# Patient Record
Sex: MALE | Race: WHITE | NOT HISPANIC OR LATINO | URBAN - METROPOLITAN AREA
[De-identification: names, ages, dates, MRNs, and addresses within clinical notes are randomized per-mention and may not be internally consistent; named-entity substitution may affect disease eponyms.]

---

## 2022-09-13 ENCOUNTER — APPOINTMENT (RX ONLY)
Dept: URBAN - METROPOLITAN AREA CLINIC 139 | Facility: CLINIC | Age: 77
Setting detail: DERMATOLOGY
End: 2022-09-13

## 2022-09-13 DIAGNOSIS — L82.1 OTHER SEBORRHEIC KERATOSIS: ICD-10-CM

## 2022-09-13 DIAGNOSIS — L57.0 ACTINIC KERATOSIS: ICD-10-CM

## 2022-09-13 DIAGNOSIS — D18.0 HEMANGIOMA: ICD-10-CM

## 2022-09-13 DIAGNOSIS — L57.8 OTHER SKIN CHANGES DUE TO CHRONIC EXPOSURE TO NONIONIZING RADIATION: ICD-10-CM

## 2022-09-13 PROBLEM — D18.01 HEMANGIOMA OF SKIN AND SUBCUTANEOUS TISSUE: Status: ACTIVE | Noted: 2022-09-13

## 2022-09-13 PROCEDURE — ? COUNSELING

## 2022-09-13 PROCEDURE — 17000 DESTRUCT PREMALG LESION: CPT

## 2022-09-13 PROCEDURE — 99213 OFFICE O/P EST LOW 20 MIN: CPT | Mod: 25

## 2022-09-13 PROCEDURE — 17003 DESTRUCT PREMALG LES 2-14: CPT

## 2022-09-13 PROCEDURE — ? LIQUID NITROGEN

## 2022-09-13 ASSESSMENT — LOCATION DETAILED DESCRIPTION DERM
LOCATION DETAILED: RIGHT PROXIMAL PRETIBIAL REGION
LOCATION DETAILED: PERIUMBILICAL SKIN
LOCATION DETAILED: STERNUM
LOCATION DETAILED: RIGHT LATERAL SUPERIOR CHEST
LOCATION DETAILED: RIGHT PROXIMAL DORSAL FOREARM
LOCATION DETAILED: LEFT SUPERIOR MEDIAL MIDBACK
LOCATION DETAILED: RIGHT LATERAL FOREHEAD
LOCATION DETAILED: LEFT PROXIMAL DORSAL FOREARM
LOCATION DETAILED: LEFT INFERIOR MEDIAL MIDBACK
LOCATION DETAILED: RIGHT INFERIOR UPPER BACK
LOCATION DETAILED: NASAL ROOT
LOCATION DETAILED: RIGHT SUPERIOR LATERAL UPPER BACK
LOCATION DETAILED: LEFT LATERAL SUPERIOR CHEST
LOCATION DETAILED: LEFT SUPERIOR LATERAL UPPER BACK
LOCATION DETAILED: LEFT INFERIOR UPPER BACK
LOCATION DETAILED: RIGHT LATERAL ABDOMEN
LOCATION DETAILED: RIGHT SUPERIOR PARIETAL SCALP
LOCATION DETAILED: RIGHT SUPERIOR UPPER BACK
LOCATION DETAILED: LEFT MID-UPPER BACK
LOCATION DETAILED: LEFT PROXIMAL PRETIBIAL REGION
LOCATION DETAILED: NASAL DORSUM
LOCATION DETAILED: LEFT MEDIAL SUPERIOR CHEST
LOCATION DETAILED: INFERIOR THORACIC SPINE
LOCATION DETAILED: EPIGASTRIC SKIN
LOCATION DETAILED: RIGHT MEDIAL INFERIOR CHEST
LOCATION DETAILED: RIGHT FOREHEAD

## 2022-09-13 ASSESSMENT — LOCATION SIMPLE DESCRIPTION DERM
LOCATION SIMPLE: LEFT UPPER BACK
LOCATION SIMPLE: LEFT FOREARM
LOCATION SIMPLE: NOSE
LOCATION SIMPLE: ABDOMEN
LOCATION SIMPLE: RIGHT FOREARM
LOCATION SIMPLE: CHEST
LOCATION SIMPLE: UPPER BACK
LOCATION SIMPLE: RIGHT FOREHEAD
LOCATION SIMPLE: LEFT LOWER BACK
LOCATION SIMPLE: SCALP
LOCATION SIMPLE: RIGHT UPPER BACK
LOCATION SIMPLE: LEFT PRETIBIAL REGION
LOCATION SIMPLE: RIGHT PRETIBIAL REGION

## 2022-09-13 ASSESSMENT — LOCATION ZONE DERM
LOCATION ZONE: NOSE
LOCATION ZONE: LEG
LOCATION ZONE: SCALP
LOCATION ZONE: ARM
LOCATION ZONE: TRUNK
LOCATION ZONE: FACE

## 2022-09-13 NOTE — PROCEDURE: LIQUID NITROGEN
Post-Care Instructions: I reviewed with the patient in detail post-care instructions. Patient is to wear sunprotection, and avoid picking at any of the treated lesions. Pt may apply Vaseline to crusted or scabbing areas.
Detail Level: Detailed
Render Post-Care Instructions In Note?: no
Show Aperture Variable?: Yes
Consent: The patient's consent was obtained including but not limited to risks of crusting, scabbing, blistering, scarring, darker or lighter pigmentary change, recurrence, incomplete removal and infection.

## 2023-03-14 ENCOUNTER — APPOINTMENT (RX ONLY)
Dept: URBAN - METROPOLITAN AREA CLINIC 139 | Facility: CLINIC | Age: 78
Setting detail: DERMATOLOGY
End: 2023-03-14

## 2023-03-14 DIAGNOSIS — L82.1 OTHER SEBORRHEIC KERATOSIS: ICD-10-CM

## 2023-03-14 DIAGNOSIS — D18.0 HEMANGIOMA: ICD-10-CM

## 2023-03-14 DIAGNOSIS — L81.4 OTHER MELANIN HYPERPIGMENTATION: ICD-10-CM

## 2023-03-14 DIAGNOSIS — L57.0 ACTINIC KERATOSIS: ICD-10-CM

## 2023-03-14 PROBLEM — D18.01 HEMANGIOMA OF SKIN AND SUBCUTANEOUS TISSUE: Status: ACTIVE | Noted: 2023-03-14

## 2023-03-14 PROCEDURE — ? COUNSELING

## 2023-03-14 PROCEDURE — 17003 DESTRUCT PREMALG LES 2-14: CPT

## 2023-03-14 PROCEDURE — 17000 DESTRUCT PREMALG LESION: CPT

## 2023-03-14 PROCEDURE — ? LIQUID NITROGEN

## 2023-03-14 PROCEDURE — 99213 OFFICE O/P EST LOW 20 MIN: CPT | Mod: 25

## 2023-03-14 ASSESSMENT — LOCATION DETAILED DESCRIPTION DERM
LOCATION DETAILED: RIGHT PROXIMAL PRETIBIAL REGION
LOCATION DETAILED: PERIUMBILICAL SKIN
LOCATION DETAILED: LEFT INFERIOR UPPER BACK
LOCATION DETAILED: RIGHT FOREHEAD
LOCATION DETAILED: LEFT SUPERIOR MEDIAL UPPER BACK
LOCATION DETAILED: RIGHT INFERIOR MEDIAL MIDBACK
LOCATION DETAILED: RIGHT CENTRAL PARIETAL SCALP
LOCATION DETAILED: RIGHT DISTAL POSTERIOR THIGH
LOCATION DETAILED: LEFT DISTAL POSTERIOR THIGH
LOCATION DETAILED: LEFT MEDIAL SUPERIOR CHEST
LOCATION DETAILED: LEFT ANTERIOR PROXIMAL THIGH
LOCATION DETAILED: LEFT LATERAL SUPERIOR CHEST
LOCATION DETAILED: RIGHT PROXIMAL CALF
LOCATION DETAILED: RIGHT MEDIAL BREAST 2-3:00 REGION
LOCATION DETAILED: INFERIOR THORACIC SPINE

## 2023-03-14 ASSESSMENT — LOCATION SIMPLE DESCRIPTION DERM
LOCATION SIMPLE: LEFT POSTERIOR THIGH
LOCATION SIMPLE: CHEST
LOCATION SIMPLE: RIGHT CALF
LOCATION SIMPLE: RIGHT FOREHEAD
LOCATION SIMPLE: RIGHT PRETIBIAL REGION
LOCATION SIMPLE: RIGHT BREAST
LOCATION SIMPLE: SCALP
LOCATION SIMPLE: RIGHT LOWER BACK
LOCATION SIMPLE: LEFT UPPER BACK
LOCATION SIMPLE: UPPER BACK
LOCATION SIMPLE: LEFT THIGH
LOCATION SIMPLE: ABDOMEN
LOCATION SIMPLE: RIGHT POSTERIOR THIGH

## 2023-03-14 ASSESSMENT — LOCATION ZONE DERM
LOCATION ZONE: LEG
LOCATION ZONE: FACE
LOCATION ZONE: TRUNK
LOCATION ZONE: SCALP

## 2023-03-14 NOTE — PROCEDURE: LIQUID NITROGEN
Show Aperture Variable?: Yes
Duration Of Freeze Thaw-Cycle (Seconds): 0
Post-Care Instructions: I reviewed with the patient in detail post-care instructions. Patient is to wear sunprotection, and avoid picking at any of the treated lesions. Pt may apply Vaseline to crusted or scabbing areas.
Render Post-Care Instructions In Note?: no
Consent: The patient's consent was obtained including but not limited to risks of crusting, scabbing, blistering, scarring, darker or lighter pigmentary change, recurrence, incomplete removal and infection.
Detail Level: Zone

## 2023-09-19 ENCOUNTER — APPOINTMENT (RX ONLY)
Dept: URBAN - METROPOLITAN AREA CLINIC 139 | Facility: CLINIC | Age: 78
Setting detail: DERMATOLOGY
End: 2023-09-19

## 2023-09-19 DIAGNOSIS — L82.1 OTHER SEBORRHEIC KERATOSIS: ICD-10-CM

## 2023-09-19 DIAGNOSIS — L81.4 OTHER MELANIN HYPERPIGMENTATION: ICD-10-CM

## 2023-09-19 DIAGNOSIS — L85.3 XEROSIS CUTIS: ICD-10-CM

## 2023-09-19 DIAGNOSIS — D18.0 HEMANGIOMA: ICD-10-CM

## 2023-09-19 DIAGNOSIS — L57.0 ACTINIC KERATOSIS: ICD-10-CM

## 2023-09-19 DIAGNOSIS — L29.89 OTHER PRURITUS: ICD-10-CM

## 2023-09-19 PROBLEM — D18.01 HEMANGIOMA OF SKIN AND SUBCUTANEOUS TISSUE: Status: ACTIVE | Noted: 2023-09-19

## 2023-09-19 PROBLEM — L29.8 OTHER PRURITUS: Status: ACTIVE | Noted: 2023-09-19

## 2023-09-19 PROCEDURE — 99213 OFFICE O/P EST LOW 20 MIN: CPT | Mod: 25

## 2023-09-19 PROCEDURE — 17000 DESTRUCT PREMALG LESION: CPT

## 2023-09-19 PROCEDURE — ? LIQUID NITROGEN

## 2023-09-19 PROCEDURE — 17003 DESTRUCT PREMALG LES 2-14: CPT

## 2023-09-19 PROCEDURE — ? COUNSELING

## 2023-09-19 ASSESSMENT — LOCATION SIMPLE DESCRIPTION DERM
LOCATION SIMPLE: LEFT UPPER BACK
LOCATION SIMPLE: RIGHT CALF
LOCATION SIMPLE: LEFT LOWER BACK
LOCATION SIMPLE: SCALP
LOCATION SIMPLE: LEFT SCALP
LOCATION SIMPLE: RIGHT LOWER BACK
LOCATION SIMPLE: RIGHT FOREHEAD
LOCATION SIMPLE: LEFT POSTERIOR THIGH
LOCATION SIMPLE: RIGHT BREAST
LOCATION SIMPLE: CHEST
LOCATION SIMPLE: RIGHT POSTERIOR THIGH
LOCATION SIMPLE: UPPER BACK
LOCATION SIMPLE: ABDOMEN
LOCATION SIMPLE: LEFT PRETIBIAL REGION
LOCATION SIMPLE: RIGHT PRETIBIAL REGION
LOCATION SIMPLE: LEFT THIGH

## 2023-09-19 ASSESSMENT — LOCATION DETAILED DESCRIPTION DERM
LOCATION DETAILED: LEFT CENTRAL PARIETAL SCALP
LOCATION DETAILED: LEFT DISTAL PRETIBIAL REGION
LOCATION DETAILED: RIGHT PROXIMAL PRETIBIAL REGION
LOCATION DETAILED: INFERIOR THORACIC SPINE
LOCATION DETAILED: LEFT INFERIOR MEDIAL MIDBACK
LOCATION DETAILED: LEFT DISTAL POSTERIOR THIGH
LOCATION DETAILED: PERIUMBILICAL SKIN
LOCATION DETAILED: RIGHT SUPERIOR FOREHEAD
LOCATION DETAILED: LEFT SUPERIOR MEDIAL UPPER BACK
LOCATION DETAILED: RIGHT DISTAL POSTERIOR THIGH
LOCATION DETAILED: LEFT MEDIAL SUPERIOR CHEST
LOCATION DETAILED: RIGHT PROXIMAL CALF
LOCATION DETAILED: RIGHT SUPERIOR PARIETAL SCALP
LOCATION DETAILED: LEFT INFERIOR UPPER BACK
LOCATION DETAILED: LEFT LATERAL SUPERIOR CHEST
LOCATION DETAILED: RIGHT INFERIOR MEDIAL MIDBACK
LOCATION DETAILED: RIGHT MEDIAL BREAST 2-3:00 REGION
LOCATION DETAILED: LEFT MEDIAL FRONTAL SCALP
LOCATION DETAILED: RIGHT CENTRAL PARIETAL SCALP
LOCATION DETAILED: RIGHT DISTAL PRETIBIAL REGION
LOCATION DETAILED: LEFT ANTERIOR PROXIMAL THIGH

## 2023-09-19 ASSESSMENT — LOCATION ZONE DERM
LOCATION ZONE: FACE
LOCATION ZONE: SCALP
LOCATION ZONE: LEG
LOCATION ZONE: TRUNK

## 2023-09-19 NOTE — PROCEDURE: LIQUID NITROGEN
Render Post-Care Instructions In Note?: no
Duration Of Freeze Thaw-Cycle (Seconds): 0
Consent: The patient's consent was obtained including but not limited to risks of crusting, scabbing, blistering, scarring, darker or lighter pigmentary change, recurrence, incomplete removal and infection.
Post-Care Instructions: I reviewed with the patient in detail post-care instructions. Patient is to wear sunprotection, and avoid picking at any of the treated lesions. Pt may apply Vaseline to crusted or scabbing areas.
Show Aperture Variable?: Yes
Detail Level: Zone

## 2024-03-26 ENCOUNTER — APPOINTMENT (RX ONLY)
Dept: URBAN - METROPOLITAN AREA CLINIC 139 | Facility: CLINIC | Age: 79
Setting detail: DERMATOLOGY
End: 2024-03-26

## 2024-03-26 DIAGNOSIS — D18.0 HEMANGIOMA: ICD-10-CM

## 2024-03-26 DIAGNOSIS — L57.0 ACTINIC KERATOSIS: ICD-10-CM

## 2024-03-26 DIAGNOSIS — L82.1 OTHER SEBORRHEIC KERATOSIS: ICD-10-CM

## 2024-03-26 DIAGNOSIS — L57.8 OTHER SKIN CHANGES DUE TO CHRONIC EXPOSURE TO NONIONIZING RADIATION: ICD-10-CM

## 2024-03-26 PROBLEM — D18.01 HEMANGIOMA OF SKIN AND SUBCUTANEOUS TISSUE: Status: ACTIVE | Noted: 2024-03-26

## 2024-03-26 PROCEDURE — ? COUNSELING

## 2024-03-26 PROCEDURE — 17003 DESTRUCT PREMALG LES 2-14: CPT

## 2024-03-26 PROCEDURE — 17000 DESTRUCT PREMALG LESION: CPT

## 2024-03-26 PROCEDURE — 99213 OFFICE O/P EST LOW 20 MIN: CPT | Mod: 25

## 2024-03-26 PROCEDURE — ? LIQUID NITROGEN

## 2024-03-26 ASSESSMENT — LOCATION DETAILED DESCRIPTION DERM
LOCATION DETAILED: RIGHT LATERAL ABDOMEN
LOCATION DETAILED: LEFT INFERIOR MEDIAL MIDBACK
LOCATION DETAILED: RIGHT SUPERIOR PARIETAL SCALP
LOCATION DETAILED: RIGHT INFERIOR MEDIAL UPPER BACK
LOCATION DETAILED: LEFT PROXIMAL DORSAL FOREARM
LOCATION DETAILED: RIGHT PROXIMAL DORSAL FOREARM
LOCATION DETAILED: EPIGASTRIC SKIN
LOCATION DETAILED: RIGHT SUPERIOR FOREHEAD
LOCATION DETAILED: RIGHT MEDIAL INFERIOR CHEST
LOCATION DETAILED: STERNUM
LOCATION DETAILED: RIGHT MID-UPPER BACK
LOCATION DETAILED: LEFT MEDIAL FRONTAL SCALP
LOCATION DETAILED: LEFT SUPERIOR MEDIAL UPPER BACK
LOCATION DETAILED: RIGHT MEDIAL SUPERIOR CHEST
LOCATION DETAILED: LEFT PROXIMAL PRETIBIAL REGION
LOCATION DETAILED: PERIUMBILICAL SKIN
LOCATION DETAILED: RIGHT SUPERIOR MEDIAL MIDBACK
LOCATION DETAILED: LEFT MEDIAL SUPERIOR CHEST
LOCATION DETAILED: RIGHT INFERIOR UPPER BACK
LOCATION DETAILED: LEFT SUPERIOR PARIETAL SCALP
LOCATION DETAILED: LEFT SUPERIOR UPPER BACK
LOCATION DETAILED: RIGHT LATERAL INFERIOR CHEST
LOCATION DETAILED: RIGHT CENTRAL FRONTAL SCALP
LOCATION DETAILED: LEFT SUPERIOR MEDIAL MIDBACK
LOCATION DETAILED: RIGHT SUPERIOR UPPER BACK
LOCATION DETAILED: RIGHT PROXIMAL PRETIBIAL REGION

## 2024-03-26 ASSESSMENT — LOCATION SIMPLE DESCRIPTION DERM
LOCATION SIMPLE: LEFT LOWER BACK
LOCATION SIMPLE: RIGHT PRETIBIAL REGION
LOCATION SIMPLE: LEFT UPPER BACK
LOCATION SIMPLE: SCALP
LOCATION SIMPLE: LEFT SCALP
LOCATION SIMPLE: RIGHT LOWER BACK
LOCATION SIMPLE: RIGHT FOREARM
LOCATION SIMPLE: CHEST
LOCATION SIMPLE: RIGHT SCALP
LOCATION SIMPLE: LEFT FOREARM
LOCATION SIMPLE: RIGHT FOREHEAD
LOCATION SIMPLE: RIGHT UPPER BACK
LOCATION SIMPLE: ABDOMEN
LOCATION SIMPLE: LEFT PRETIBIAL REGION

## 2024-03-26 ASSESSMENT — LOCATION ZONE DERM
LOCATION ZONE: LEG
LOCATION ZONE: SCALP
LOCATION ZONE: FACE
LOCATION ZONE: ARM
LOCATION ZONE: TRUNK

## 2025-03-06 NOTE — PROCEDURE: COUNSELING
Consult Note: HPI    
Data of Consult    
Patient: known to practice within the last 3 years    
Requesting Physician:     
Lorna Nichols NP    
    
Primary Care Provider:     
Pepper Mendoza NP    
    
Consult Narrative    
Reason for consult: low back, bilateral lower extremity pain    
Narrative:     
72yom who presents for evaluation. longstanding low back pain history with   
radiation into bilateral lower extremities. imaging shows multilevel severe   
stenosis, worst at l3-4. also multilevel facet arthropathy noted. recently   
evaluated by spine surgeon, who did not recommend surgery due to myriad other   
health issues. has continued in a series of provider directed home exercises >6   
weeks, without benefit. has used flexeril with some benefit. denies adverse med   
side effects. recently underwent bilateral L3/4 TFESI and bilateral L4/5 TFESI   
with less than 50% improvement for 3 months.     
cc::     
CC: Lorna Nichols NP    
    
    
Review of Systems    
    
    
ROS      
    
 Status of ROS                          10 or more systems reviewed and unremark    
able except as noted in     
history and below       
    
 Musculoskeletal Reports: back pain, neck pain, extremity pain and joint pain       
    
    
[facility]    
[facility]    
Medical History (Updated 03/06/25 @ 13:32 by Lorna Nichols NP)    
    
Osteoarthritis    
   ?M19.90 - Unspecified osteoarthritis, unspecified site (ICD-10)    
Fistula    
   ?L98.8 - Other specified disorders of the skin and subcutaneous tissue (ICD-  
   10)    
Trigger finger    
   ?M65.30 - Trigger finger, unspecified finger (ICD-10)    
Blind    
   ?H54.7 - Unspecified visual loss (ICD-10)    
Anxiety    
   ?F41.9 - Anxiety disorder, unspecified (ICD-10)    
Acid reflux    
   ?K21.9 - Gastro-esophageal reflux disease without esophagitis (ICD-10)    
Diabetes    
   ?E11.9 - Type 2 diabetes mellitus without complications (ICD-10)    
Kidney stone    
   ?N20.0 - Calculus of kidney (ICD-10)    
Cirrhosis of liver    
   ?K74.60 - Unspecified cirrhosis of liver (ICD-10)    
Requires peritoneal dialysis    
   ?Z99.2 - Dependence on renal dialysis (ICD-10)    
Kidney failure    
   ?N19 - Unspecified kidney failure (ICD-10)    
COPD (chronic obstructive pulmonary disease)    
   ?J44.9 - Chronic obstructive pulmonary disease, unspecified (ICD-10)    
Asthma    
   ?J45.909 - Unspecified asthma, uncomplicated (ICD-10)    
HTN (hypertension)    
   ?I10 - Essential (primary) hypertension (ICD-10)    
Palpitations    
   ?R00.2 - Palpitations (ICD-10)    
Heart attack    
   ?I21.9 - Acute myocardial infarction, unspecified (ICD-10)    
High cholesterol    
   ?E78.00 - Pure hypercholesterolemia, unspecified (ICD-10)    
    
    
Surgical History (Reviewed 12/05/24 @ 11:43 by Lorna Nichols NP)    
    
Hx of cholecystectomy    
   ?Z90.49 - Acquired absence of other specified parts of digestive tract (ICD-  
   10)    
History of rhinoplasty    
   ?Z98.890 - Other specified postprocedural states (ICD-10)    
History of cardiac cath    
   ?Z98.890 - Other specified postprocedural states (ICD-10)    
History of arthroscopic knee surgery    
   ?Z98.890 - Other specified postprocedural states (ICD-10)    
History of vasectomy    
   ?Z98.52 - Vasectomy status (ICD-10)    
    
    
    
Meds    
Home Medications and Allergies    
                                Home Medications    
    
    
    
?Medication ?Instructions ?Recorded ?Confirmed ?Type    
     
acetaminophen 650 mg 1,950 mg PO Q12H PRN fever or pain 10/21/24 02/23/25   
History    
    
tablet,extended release (Tylenol        
    
Arthritis Pain)        
     
amlodipine 5 mg tablet 5 mg PO DAILY 10/21/24 02/23/25 History    
     
bumetanide 2 mg tablet 2 mg PO DAILY 10/21/24 02/23/25 History    
     
cyclobenzaprine 10 mg tablet 10 mg PO Q12H 10/21/24 02/23/25 History    
     
hydroxyzine pamoate 25 mg capsule 25 mg PO TID PRN anxiety 10/21/24 02/23/25   
History    
     
insulin aspart 6 unit subcut QID 10/21/24 02/23/25 History    
    
(niacinamide)(U-100) 100 unit/mL(3        
    
mL) subcutaneous pen (Fiasp        
    
FlexTouch U-100 Insulin)        
     
insulin glargine 100 unit/mL (3 10 unit subcut QAM 10/21/24 11/11/24 History    
    
mL) subcutaneous pen (Basaglar        
    
KwikPen U-100 Insulin)        
     
magnesium oxide 400 mg PO DAILY 10/21/24 02/23/25 History    
     
melatonin 10 mg capsule 10 mg PO QPM 10/21/24 02/23/25 History    
     
omeprazole 20 mg capsule,delayed 20 mg PO BID 10/21/24 02/23/25 History    
    
release        
     
sevelamer carbonate 800 mg tablet 800 mg PO TID 10/21/24 02/23/25 History    
    
(Renvela)        
     
HEPARIN .QOD 10/28/24  History    
     
albuterol sulfate 90 mcg/actuation 2 inh inhalation Q4H PRN shortness 02/23/25 02/23/25 History    
    
aerosol inhaler of breath or wheezing       
     
albuterol sulfate 90 mcg/actuation 2 inh inhalation Q6H PRN shortness 02/23/25    
Rx    
    
aerosol inhaler of breath or wheezing #8.5 grams       
     
cetirizine 10 mg capsule 10 mg PO DAILY PRN allergy symptoms 02/23/25 02/23/25   
History    
     
docusate sodium 100 mg capsule 100 mg PO DAILY PRN constipation 02/23/25 02/23/25 History    
     
doxycycline hyclate 100 mg capsule 100 mg PO BID 7 days #14 caps 02/23/25  Rx    
     
insulin glargine 100 unit/mL (3 10 unit subcut QAM 02/23/25 02/23/25 History    
    
mL) subcutaneous pen (Basaglar        
    
KwikPen U-100 Insulin)        
     
insulin glargine 100 unit/mL (3 15 unit subcut QPM 02/23/25 02/23/25 History    
    
mL) subcutaneous pen (Basaglar        
    
KwikPen U-100 Insulin)        
    
    
    
                                    Allergies    
    
    
    
Allergy/AdvReac Type Severity Reaction Status Date / Time    
     
thiopental (From Pentothal) Allergy  Agitated Verified 11/11/24 07:40    
    
    
    
    
Exam    
Constitutional    
Documenting provider has reviewed patient's vital signs: yes    
Common normals: no apparent distress, oriented x3, healthy appearing, alert and   
well nourished    
General appearance: cooperative    
[facility]    
Common normals: normocephalic, hearing grossly normal bilaterally and moist oral  
mucous membranes    
Head and scalp: normocephalic    
Eye    
Common normals: PERRL    
Pupil: PERRL    
Neck & C-Spine    
Common normals: full ROM    
General: normal visual inspection    
Chest    
Common normals: inspection of chest normal    
Respiratory    
Common normals: normal respiratory effort, no retractions and no use of   
accessory muscles    
Back & Pelvis    
Lumbar spine/lower back: ROM limited, pain with ROM and lumbar spinal tenderness    
Sacroiliac joints: SI joint(s) abnormal    
Other:     
bilateral positive yoko(patricks), gaenslens, thigh thrust, compression test    
Neuro    
Common normals: oriented x3, CN's II-XII intact bilaterally, moves all   
extremities, no focal motor deficits, no sensory deficits noted and deep tendon   
reflexes 2+ bilaterally    
Sensorium/orientation: alert    
Motor exam: no movement abnormalities noted and strength abnormal    
Psych    
Common normals: mental status grossly normal, thought process normal,   
cooperative, affect normal, speech normal and activity/motor behavior normal    
Speech: normal speech    
Thought process: normal thought process    
    
Results    
Additional Findings    
Additional findings:     
If on a controlled substance or opioids, I have checked an [place] report on this   
patient and there are no aberrancies noted in the prescribing history.??If on a   
controlled substance or opioid a drug screen was completed and reviewed within   
the last year, and if there has not been a drug screen completed we ordered one   
today to monitor higher risk, state monitored pain medication use.     
    
As part of providing excellent, safe, comprehensive care, the following was   
completed at our patient's visit:    
1. A medication reconciliation and review to ensure accurate knowledge of   
current/active medications, including asking our patients to inform us about any  
over-the-counter medications or herbal remedies/nutritional supplements  
/alternative remedies.    
2. A review to specifically ensure our patients have had annual screening for   
screening for depression, screening for tobacco use, and screening for unhealthy  
alcohol use. For concerning screenings had a discussion with the patient,   
provided patient education, and recommended follow-up with primary care provider  
when appropriate. If patient noted with a risk of falling, they received   
education on strength, gait, and balance training to prevent future risk of   
falling.    
    
Assessment and Plan    
Assessment and Plan    
(1) Sacroiliac joint dysfunction of both sides:     
(2) Lumbar stenosis with neurogenic claudication:     
(3) Lumbar spondylosis:     
(4) Generalized weakness:     
    
Plan    
bilateral SIJ injection under fluoroscopy    
pt has failed tylenol, cannot take NSAIDs with ESRD, very high risk for   
medication management but continues to have severe debilitating pain. will trial  
butrans 5mcg/hr q7days, risks vs benefits reviewed. pt agreeable to complete   
avoidance of marijuana and alcohol while on this medication     
update UDS today    
f/u 2 weeks after injection
Detail Level: Zone
Detail Level: Detailed